# Patient Record
Sex: MALE | Race: BLACK OR AFRICAN AMERICAN | NOT HISPANIC OR LATINO | URBAN - METROPOLITAN AREA
[De-identification: names, ages, dates, MRNs, and addresses within clinical notes are randomized per-mention and may not be internally consistent; named-entity substitution may affect disease eponyms.]

---
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
## 2021-02-26 ENCOUNTER — APPOINTMENT (OUTPATIENT)
Dept: PEDIATRICS | Facility: CLINIC | Age: 21
End: 2021-02-26
Payer: COMMERCIAL

## 2021-02-26 VITALS — TEMPERATURE: 97.4 F | WEIGHT: 144 LBS

## 2021-02-26 DIAGNOSIS — B34.9 VIRAL INFECTION, UNSPECIFIED: ICD-10-CM

## 2021-02-26 PROCEDURE — 99213 OFFICE O/P EST LOW 20 MIN: CPT

## 2021-02-26 PROCEDURE — 99072 ADDL SUPL MATRL&STAF TM PHE: CPT

## 2021-02-26 NOTE — HISTORY OF PRESENT ILLNESS
[de-identified] : 21 y/o female adolescent in the office today for headache, diarrhea, and congestion. Pt states that he has not been feeling himself, states that he did come in contact with someone who was positive for COVID. No difficulty breathing, afebrile.  [FreeTextEntry6] : HA, loose stools, mild abd pain, congested x 4 days.  No fevers.  His friend is sick, waiting for his Covid results.

## 2021-03-02 LAB — SARS-COV-2 N GENE NPH QL NAA+PROBE: NOT DETECTED

## 2021-05-20 ENCOUNTER — APPOINTMENT (OUTPATIENT)
Dept: PEDIATRICS | Facility: CLINIC | Age: 21
End: 2021-05-20
Payer: COMMERCIAL

## 2021-05-20 VITALS — TEMPERATURE: 98 F | WEIGHT: 142 LBS

## 2021-05-20 DIAGNOSIS — J30.9 ALLERGIC RHINITIS, UNSPECIFIED: ICD-10-CM

## 2021-05-20 PROCEDURE — 99214 OFFICE O/P EST MOD 30 MIN: CPT

## 2021-05-20 RX ORDER — FLUTICASONE PROPIONATE 50 UG/1
50 SPRAY, METERED NASAL TWICE DAILY
Qty: 1 | Refills: 3 | Status: ACTIVE | COMMUNITY
Start: 2021-05-20 | End: 1900-01-01

## 2021-05-20 NOTE — DISCUSSION/SUMMARY
[FreeTextEntry1] : Can try Xyzal, Zaditor eye drops\par Flonase bid\par Frequent hand/face washings.

## 2021-05-20 NOTE — HISTORY OF PRESENT ILLNESS
[de-identified] : c/o allergies really congested and having nose bleeds [FreeTextEntry6] : Nasal congestion, cough, eyes burning for a few weeks.  Tried Claritin, Allegra and now Zytec.  No fevers. No known sick/Covid contacts.

## 2021-05-20 NOTE — REVIEW OF SYSTEMS
[Headache] : no headache [Itchy Eyes] : itchy eyes [Ear Pain] : no ear pain [Nasal Congestion] : nasal congestion [Sore Throat] : no sore throat [Wheezing] : wheezing [Cough] : cough [Shortness of Breath] : no shortness of breath [Negative] : Skin

## 2021-06-18 ENCOUNTER — APPOINTMENT (OUTPATIENT)
Dept: PEDIATRICS | Facility: CLINIC | Age: 21
End: 2021-06-18
Payer: COMMERCIAL

## 2021-06-18 VITALS — WEIGHT: 146.2 LBS | TEMPERATURE: 97.3 F

## 2021-06-18 PROCEDURE — 99214 OFFICE O/P EST MOD 30 MIN: CPT

## 2021-06-18 NOTE — DISCUSSION/SUMMARY
[FreeTextEntry1] : D/W caregiver wart; reviewed cryotherapy in office; reviewed starting 24hrs after treatment in office the wart should be soaked in warm water for at least five minutes and hyperkeratotic skin should be removed (ie, pared) with a nail file or pumice stone; skin adjacent to wart may become red, blistered due to cryotherapy and this should self resolve. 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

## 2021-06-18 NOTE — HISTORY OF PRESENT ILLNESS
[de-identified] : Per pt callus on right foot, painful.  [FreeTextEntry6] : 1. Pt with pencil point in right foot 6yrs ago- broke off in skin- now pain in that area\par 2. right large toe callous X3days- painful to walk on; also a little bit on left large toe- has notice rubbing with shoes; no current treatment; no fevers, eating and drinking well, right large toe rubs against second toe \par meds: zyrtec, fenastra- prescribed by hair tech for hair growth per pt.

## 2021-06-18 NOTE — PHYSICAL EXAM
[NL] : regular rate and rhythm, normal S1, S2 audible, no murmurs [de-identified] : valgus deformity at MTP b/l large toe- more prominent right large toe than left [de-identified] : + blister to medial large toes b/l- right more than left; + plantar wart to right lateral plantar surface of foot

## 2021-06-21 ENCOUNTER — APPOINTMENT (OUTPATIENT)
Dept: PEDIATRICS | Facility: CLINIC | Age: 21
End: 2021-06-21
Payer: COMMERCIAL

## 2021-06-21 VITALS — WEIGHT: 146.4 LBS | TEMPERATURE: 98.1 F

## 2021-06-21 DIAGNOSIS — Z20.822 CONTACT WITH AND (SUSPECTED) EXPOSURE TO COVID-19: ICD-10-CM

## 2021-06-21 DIAGNOSIS — S90.426A BLISTER (NONTHERMAL), UNSPECIFIED LESSER TOE(S), INITIAL ENCOUNTER: ICD-10-CM

## 2021-06-21 DIAGNOSIS — R23.8 OTHER SKIN CHANGES: ICD-10-CM

## 2021-06-21 DIAGNOSIS — L29.9 PRURITUS, UNSPECIFIED: ICD-10-CM

## 2021-06-21 PROCEDURE — 99214 OFFICE O/P EST MOD 30 MIN: CPT

## 2021-06-21 NOTE — DISCUSSION/SUMMARY
[FreeTextEntry1] : rest, ice, elevate, ibuprofen as needed\par will send for xray finger, pt. has many jammed fingers in the past and he is in a considerable amount more pain than prior experiences\par Splint applied for comfort

## 2021-06-21 NOTE — HISTORY OF PRESENT ILLNESS
[de-identified] : pt states a few days ago he jammed his left index finger and it hurts to extend his finger, feels better curled up. [FreeTextEntry6] : jammed left index finger 3 days ago playing basketball \par Still having a lot of pain and it is swollen\par No ecchymosis \par Iced x 2 days

## 2021-07-21 ENCOUNTER — TRANSCRIPTION ENCOUNTER (OUTPATIENT)
Age: 21
End: 2021-07-21

## 2021-10-06 ENCOUNTER — APPOINTMENT (OUTPATIENT)
Dept: PEDIATRICS | Facility: CLINIC | Age: 21
End: 2021-10-06
Payer: COMMERCIAL

## 2021-10-06 VITALS
DIASTOLIC BLOOD PRESSURE: 72 MMHG | TEMPERATURE: 96.8 F | SYSTOLIC BLOOD PRESSURE: 122 MMHG | WEIGHT: 141.1 LBS | HEART RATE: 87 BPM

## 2021-10-06 DIAGNOSIS — B34.9 VIRAL INFECTION, UNSPECIFIED: ICD-10-CM

## 2021-10-06 DIAGNOSIS — S69.92XA UNSPECIFIED INJURY OF LEFT WRIST, HAND AND FINGER(S), INITIAL ENCOUNTER: ICD-10-CM

## 2021-10-06 DIAGNOSIS — R11.10 VOMITING, UNSPECIFIED: ICD-10-CM

## 2021-10-06 PROCEDURE — 99213 OFFICE O/P EST LOW 20 MIN: CPT

## 2021-10-06 NOTE — HISTORY OF PRESENT ILLNESS
[de-identified] : as per pt he has had a headache, nauseous, and vomiting  [FreeTextEntry6] : vomited yesterday\par feeling nauseous but able to tolerate breakfast today\par no fever\par no cough\par congested\par no diarrhea\par

## 2021-10-11 LAB — SARS-COV-2 N GENE NPH QL NAA+PROBE: NOT DETECTED

## 2022-02-03 ENCOUNTER — APPOINTMENT (OUTPATIENT)
Dept: PEDIATRICS | Facility: CLINIC | Age: 22
End: 2022-02-03
Payer: COMMERCIAL

## 2022-02-03 VITALS
DIASTOLIC BLOOD PRESSURE: 70 MMHG | WEIGHT: 141.7 LBS | SYSTOLIC BLOOD PRESSURE: 102 MMHG | BODY MASS INDEX: 20.28 KG/M2 | HEIGHT: 70 IN | HEART RATE: 75 BPM

## 2022-02-03 DIAGNOSIS — Z87.898 PERSONAL HISTORY OF OTHER SPECIFIED CONDITIONS: ICD-10-CM

## 2022-02-03 DIAGNOSIS — Z13.29 ENCOUNTER FOR SCREENING FOR OTHER SUSPECTED ENDOCRINE DISORDER: ICD-10-CM

## 2022-02-03 DIAGNOSIS — E55.9 VITAMIN D DEFICIENCY, UNSPECIFIED: ICD-10-CM

## 2022-02-03 DIAGNOSIS — Z00.00 ENCOUNTER FOR GENERAL ADULT MEDICAL EXAMINATION W/OUT ABNORMAL FINDINGS: ICD-10-CM

## 2022-02-03 DIAGNOSIS — L65.9 NONSCARRING HAIR LOSS, UNSPECIFIED: ICD-10-CM

## 2022-02-03 DIAGNOSIS — B07.0 PLANTAR WART: ICD-10-CM

## 2022-02-03 DIAGNOSIS — M20.11 HALLUX VALGUS (ACQUIRED), RIGHT FOOT: ICD-10-CM

## 2022-02-03 PROCEDURE — 99173 VISUAL ACUITY SCREEN: CPT | Mod: 59

## 2022-02-03 PROCEDURE — 96127 BRIEF EMOTIONAL/BEHAV ASSMT: CPT

## 2022-02-03 PROCEDURE — 92551 PURE TONE HEARING TEST AIR: CPT

## 2022-02-03 PROCEDURE — 96160 PT-FOCUSED HLTH RISK ASSMT: CPT | Mod: 59

## 2022-02-03 PROCEDURE — 99395 PREV VISIT EST AGE 18-39: CPT | Mod: 25

## 2022-02-04 PROBLEM — B07.0 PLANTAR WART OF RIGHT FOOT: Status: RESOLVED | Noted: 2021-06-18 | Resolved: 2022-02-04

## 2022-02-04 PROBLEM — Z87.898 HISTORY OF NAUSEA AND VOMITING: Status: RESOLVED | Noted: 2021-10-06 | Resolved: 2022-02-04

## 2022-02-04 PROBLEM — Z00.00 ENCOUNTER FOR PREVENTIVE HEALTH EXAMINATION: Status: ACTIVE | Noted: 2019-07-08

## 2022-02-04 PROBLEM — M20.11 HALLUX VALGUS OF RIGHT FOOT: Status: RESOLVED | Noted: 2021-06-18 | Resolved: 2022-02-04

## 2022-02-04 NOTE — HISTORY OF PRESENT ILLNESS
[Up to date] : Up to date [Uses safety belts/safety equipment] : uses safety belts/safety equipment  [Yes] : Patient has had sexual intercourse. [de-identified] : self [de-identified] : due Tdap in 8/2022 sooner if injury [de-identified] : uses condoms [FreeTextEntry1] : CHRISTINE  is here for 21 year  well child visit[\par \par Eating eats chicken, no pork, beef ,other protein sources\par Drugs:, marijuana , etoh marijuana discussed\par Safety: uses safety belts/safety equipment . \par Patient is doing well at home.\par Past medical history reviewed.\par Immunizations up to date\par Oral: , routine dental visits\par Behavior/ Activity: exercises 1 hour per day\par Safety: appropriately restrained in motor vehicle \par Sleeping: issues for years no sleep apnea\par Urination and bowel moments normal\par Current grade: [email or web address], major psychology\par \par Parent(s) have current concerns or issues:\par in past referred to dermatology 2020 not evaluated , would lke rx for Propecia\par

## 2022-02-04 NOTE — DISCUSSION/SUMMARY
[Met privately with the adolescent for part of the office visit?] : Met privately with the adolescent for part of the office visit? Yes [Adolescent asks questions during each office  visit and participates in the care plan?] : Adolescent asks questions during each office visit and participates in the care plan? Yes [Discussed choices for adult care and assist in identifying possible care providers?] : Discussed choices for adult care and assist in identifying possible care providers? Yes [FreeTextEntry1] : \par COUNSELING/EDUCATION\par reviewed chart nkda no surgeries no new medical problems as few years since last well visit\par refer dermatology patient will call to see if will evaluate for current medication\par Reviewed  5-2-1-0 Healthy Habits Questionnaire\par \par Cardiac screening is negative\par \par CARE COORDINATION  reviewed\par  Immunizations reviewed\par \par \par \par \par \par The following 15 to 21 year anticipatory guidance topics were discussed and/or handouts given: physical growth and development, social and academic competence, emotional well-being, risk reduction  and  injury prevention. Counseling for nutrition and physical activity was provided. \par \par Information discussed with patient .\par I have examined the above-named student and completed the preparticipation physical evaluation. The patient does not present apparent clinical contraindications to practice and participate in sport(s) as outlined above. If conditions arise after the athlete has been cleared for participation, the physician may rescind the clearance until the problem is resolved and the potential consequences are completely explained to the athlete (and parents/guardians).\par discussed bexsero handout given, gardasil\par HIPPA ok labs to parents\par \par nest year to see internist for well visit\par \par

## 2023-05-10 NOTE — ED PEDIATRIC NURSE NOTE - NS CRAFFT PART A VALIDATION ALCOHOL
Patient answered NO to all of the above 3 questions...
